# Patient Record
Sex: MALE | Race: WHITE | NOT HISPANIC OR LATINO | Employment: FULL TIME | URBAN - METROPOLITAN AREA
[De-identification: names, ages, dates, MRNs, and addresses within clinical notes are randomized per-mention and may not be internally consistent; named-entity substitution may affect disease eponyms.]

---

## 2017-01-11 LAB
BUN SERPL-MCNC: 17 MG/DL (ref 7–18)
CALCIUM SERPL-MCNC: 10.1 MG/DL (ref 8.5–10.1)
CHLORIDE SERPL-SCNC: 103 MMOL/L (ref 98–107)
CHOLEST SERPL-MCNC: 177 MG/DL
CO2 SERPL-SCNC: 28 MMOL/L (ref 21–32)
CREAT SERPL-MCNC: 1.11 MG/DL (ref 0.6–1.3)
GLUCOSE SERPL-MCNC: 109 MG/DL (ref 74–106)
HDLC SERPL-MCNC: 53 MG/DL (ref 35–60)
LDLC SERPL CALC-MCNC: 110 MG/DL
POTASSIUM SERPL-SCNC: 4.5 MMOL/L (ref 3.5–5.1)
SODIUM SERPL-SCNC: 138 MEQ/L (ref 131–145)
TRIGL SERPL-MCNC: 93 MG/DL (ref 30–150)

## 2018-12-28 ENCOUNTER — HISTORICAL (OUTPATIENT)
Dept: ADMINISTRATIVE | Facility: HOSPITAL | Age: 53
End: 2018-12-28

## 2018-12-28 LAB
ALBUMIN SERPL-MCNC: 4.5 GM/DL (ref 3.4–5)
ALBUMIN/GLOB SERPL: 1.55 {RATIO} (ref 1.5–2.5)
ALP SERPL-CCNC: 53 UNIT/L (ref 38–126)
ALT SERPL-CCNC: 33 UNIT/L (ref 7–52)
AST SERPL-CCNC: 24 UNIT/L (ref 15–37)
BILIRUB SERPL-MCNC: 1 MG/DL (ref 0.2–1)
BILIRUBIN DIRECT+TOT PNL SERPL-MCNC: 0.2 MG/DL (ref 0–0.5)
BILIRUBIN DIRECT+TOT PNL SERPL-MCNC: 0.8 MG/DL
BUN SERPL-MCNC: 16 MG/DL (ref 7–18)
CALCIUM SERPL-MCNC: 9.2 MG/DL (ref 8.5–10)
CHLORIDE SERPL-SCNC: 106 MMOL/L (ref 98–107)
CHOLEST SERPL-MCNC: 186 MG/DL (ref 0–200)
CHOLEST/HDLC SERPL: 4 {RATIO}
CO2 SERPL-SCNC: 27 MMOL/L (ref 21–32)
CREAT SERPL-MCNC: 1.04 MG/DL (ref 0.6–1.3)
GLOBULIN SER-MCNC: 2.9 GM/DL (ref 1.2–3)
GLUCOSE SERPL-MCNC: 105 MG/DL (ref 74–106)
HDLC SERPL-MCNC: 46 MG/DL (ref 35–60)
LDLC SERPL CALC-MCNC: 121 MG/DL (ref 0–129)
POTASSIUM SERPL-SCNC: 4.3 MMOL/L (ref 3.5–5.1)
PROT SERPL-MCNC: 7.4 GM/DL (ref 6.4–8.2)
PSA SERPL-MCNC: 0.25 NG/ML (ref 0–3.5)
SODIUM SERPL-SCNC: 138 MMOL/L (ref 136–145)
TRIGL SERPL-MCNC: 101 MG/DL (ref 30–150)
VLDLC SERPL CALC-MCNC: 20.2 MG/DL

## 2019-07-01 ENCOUNTER — HISTORICAL (OUTPATIENT)
Dept: ADMINISTRATIVE | Facility: HOSPITAL | Age: 54
End: 2019-07-01

## 2019-07-01 LAB
ALBUMIN SERPL-MCNC: 4.5 GM/DL (ref 3.4–5)
ALBUMIN/GLOB SERPL: 1.8 {RATIO} (ref 1.5–2.5)
ALP SERPL-CCNC: 72 UNIT/L (ref 38–126)
ALT SERPL-CCNC: 29 UNIT/L (ref 7–52)
AST SERPL-CCNC: 23 UNIT/L (ref 15–37)
BILIRUB SERPL-MCNC: 0.8 MG/DL (ref 0.2–1)
BILIRUBIN DIRECT+TOT PNL SERPL-MCNC: 0.2 MG/DL (ref 0–0.5)
BILIRUBIN DIRECT+TOT PNL SERPL-MCNC: 0.6 MG/DL
BUN SERPL-MCNC: 11 MG/DL (ref 7–18)
CALCIUM SERPL-MCNC: 9.5 MG/DL (ref 8.5–10)
CHLORIDE SERPL-SCNC: 104 MMOL/L (ref 98–107)
CHOLEST SERPL-MCNC: 140 MG/DL (ref 0–200)
CHOLEST/HDLC SERPL: 2.8 {RATIO}
CO2 SERPL-SCNC: 28 MMOL/L (ref 21–32)
CREAT SERPL-MCNC: 0.94 MG/DL (ref 0.6–1.3)
GLOBULIN SER-MCNC: 2.5 GM/DL (ref 1.2–3)
GLUCOSE SERPL-MCNC: 99 MG/DL (ref 74–106)
HDLC SERPL-MCNC: 50 MG/DL (ref 35–60)
LDLC SERPL CALC-MCNC: 65 MG/DL (ref 0–129)
POTASSIUM SERPL-SCNC: 4.5 MMOL/L (ref 3.5–5.1)
PROT SERPL-MCNC: 7 GM/DL (ref 6.4–8.2)
PSA SERPL-MCNC: 0.28 NG/ML (ref 0–3.5)
SODIUM SERPL-SCNC: 138 MMOL/L (ref 136–145)
TRIGL SERPL-MCNC: 54 MG/DL (ref 30–150)
VLDLC SERPL CALC-MCNC: 10.8 MG/DL

## 2020-11-18 ENCOUNTER — HISTORICAL (OUTPATIENT)
Dept: ADMINISTRATIVE | Facility: HOSPITAL | Age: 55
End: 2020-11-18

## 2020-11-18 LAB
ALBUMIN SERPL-MCNC: 5 GM/DL (ref 3.4–5)
ALBUMIN/GLOB SERPL: 1.79 {RATIO} (ref 1.5–2.5)
ALP SERPL-CCNC: 75 UNIT/L (ref 38–126)
ALT SERPL-CCNC: 28 UNIT/L (ref 7–52)
AST SERPL-CCNC: 26 UNIT/L (ref 15–37)
BILIRUB SERPL-MCNC: 0.9 MG/DL (ref 0.2–1)
BILIRUBIN DIRECT+TOT PNL SERPL-MCNC: 0.2 MG/DL (ref 0–0.5)
BILIRUBIN DIRECT+TOT PNL SERPL-MCNC: 0.7 MG/DL
BUN SERPL-MCNC: 18 MG/DL (ref 7–18)
CALCIUM SERPL-MCNC: 10.2 MG/DL (ref 8.5–10.1)
CHLORIDE SERPL-SCNC: 102 MMOL/L (ref 98–107)
CHOLEST SERPL-MCNC: 173 MG/DL (ref 0–200)
CHOLEST/HDLC SERPL: 2.3 {RATIO}
CO2 SERPL-SCNC: 26 MMOL/L (ref 21–32)
CREAT SERPL-MCNC: 1.09 MG/DL (ref 0.6–1.3)
GLOBULIN SER-MCNC: 2.8 GM/DL (ref 1.2–3)
GLUCOSE SERPL-MCNC: 97 MG/DL (ref 74–106)
HDLC SERPL-MCNC: 76 MG/DL (ref 35–60)
LDLC SERPL CALC-MCNC: 89 MG/DL (ref 0–129)
POTASSIUM SERPL-SCNC: 4.7 MMOL/L (ref 3.5–5.1)
PROT SERPL-MCNC: 7.8 GM/DL (ref 6.4–8.2)
PSA SERPL-MCNC: 0.37 NG/ML (ref 0–3.5)
SODIUM SERPL-SCNC: 139 MMOL/L (ref 136–145)
TRIGL SERPL-MCNC: 99 MG/DL (ref 30–150)
VLDLC SERPL CALC-MCNC: 19.8 MG/DL

## 2021-11-19 ENCOUNTER — HISTORICAL (OUTPATIENT)
Dept: ADMINISTRATIVE | Facility: HOSPITAL | Age: 56
End: 2021-11-19

## 2021-11-19 LAB
ALBUMIN SERPL-MCNC: 4.7 GM/DL (ref 3.4–5)
ALBUMIN/GLOB SERPL: 1.68 {RATIO} (ref 1.5–2.5)
ALP SERPL-CCNC: 63 UNIT/L (ref 38–126)
ALT SERPL-CCNC: 43 UNIT/L (ref 7–52)
AST SERPL-CCNC: 37 UNIT/L (ref 15–37)
BILIRUB SERPL-MCNC: 0.7 MG/DL (ref 0.2–1)
BILIRUBIN DIRECT+TOT PNL SERPL-MCNC: 0.1 MG/DL (ref 0–0.5)
BILIRUBIN DIRECT+TOT PNL SERPL-MCNC: 0.6 MG/DL
BUN SERPL-MCNC: 16 MG/DL (ref 7–18)
CALCIUM SERPL-MCNC: 9.9 MG/DL (ref 8.5–10.1)
CHLORIDE SERPL-SCNC: 103 MMOL/L (ref 98–107)
CHOLEST SERPL-MCNC: 194 MG/DL (ref 0–200)
CHOLEST/HDLC SERPL: 3.2 {RATIO}
CO2 SERPL-SCNC: 22 MMOL/L (ref 21–32)
CREAT SERPL-MCNC: 1.01 MG/DL (ref 0.6–1.3)
GLOBULIN SER-MCNC: 2.8 GM/DL (ref 1.2–3)
GLUCOSE SERPL-MCNC: 103 MG/DL (ref 74–106)
HDLC SERPL-MCNC: 60 MG/DL (ref 35–60)
LDLC SERPL CALC-MCNC: 108 MG/DL (ref 0–129)
POTASSIUM SERPL-SCNC: 5 MMOL/L (ref 3.5–5.1)
PROT SERPL-MCNC: 7.5 GM/DL (ref 6.4–8.2)
PSA SERPL-MCNC: 0.21 NG/ML (ref 0–3.5)
SODIUM SERPL-SCNC: 140 MMOL/L (ref 136–145)
TRIGL SERPL-MCNC: 121 MG/DL (ref 30–150)
VLDLC SERPL CALC-MCNC: 24.2 MG/DL

## 2021-11-22 LAB — EST CREAT CLEARANCE SER (OHS): 107.07 ML/MIN

## 2022-04-10 ENCOUNTER — HISTORICAL (OUTPATIENT)
Dept: ADMINISTRATIVE | Facility: HOSPITAL | Age: 57
End: 2022-04-10

## 2022-04-27 VITALS
HEIGHT: 68 IN | DIASTOLIC BLOOD PRESSURE: 70 MMHG | SYSTOLIC BLOOD PRESSURE: 120 MMHG | BODY MASS INDEX: 30.61 KG/M2 | WEIGHT: 201.94 LBS

## 2022-05-03 NOTE — HISTORICAL [facility]
This is a historical note converted from Naun. Formatting and pictures may have been removed.  Please reference Naun for original formatting and attached multimedia. Chief Complaint  Wellness  History of Present Illness  85-year-old  male presents the office today for scheduled annual wellness examination and recheck of chronic conditions including?hyperlipidemia, anxiety/depression.? He reports 100% compliance with all medications. ?Denies any side effects/adverse reactions/intolerances medications.? He is overdue?for his Tdap vaccination.? Expresses that he is willing to receive this today in the exam room.? Pleasantly but adamantly declines/refuses the?2020 influenza vaccination.? Next colonoscopy?scheduled for 2021.? Last PSA 0.28.? Personal prescription medication is working as intended.? No side effects?as mentioned above.? Denies suicidal ideation etc.? Denies nicotine/tobacco use. ?Denies illicit drug use.? He expresses that he participates in daily cardiovascular exercise?combination of?walking/jogging on treadmill?as well as a home elliptical.? Expresses he does this?approximately 3-4 days weekly approximately 45 minutes to 1 hour?on each day.? 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  Review of Systems  ?14 point Review of Systems performed with no exceptions for new complaints other than as noted in HPI.  Physical Exam  Vitals & Measurements  HR:?76(Peripheral)? BP:?104/82?  HT:?172.72?cm? WT:?90.8?kg?  ?  PHYSICAL EXAM  ?   WDWN patient in NAD, ?AFVSS  HEENT - no acute abnormality  ??????????????? oropharynx WNL  HEART - RRR  LUNGS -? CTA  ABDOMEN - benign, NTND;? no peritoneal signs  EXTREMITIES - No CCE  SKIN - warm, dry, intact  PSYCH - affect appropriate;? alert and oriented  NEURO- no new deficits noted;? cranial nerves grossly intact  ?  Assessment/Plan  1.?Wellness examination?Z00.00  ?Wellness labs today.? Doing well. ?Continue with  cardiovascular exercise.? Continue to limit excessive simple carbohydrates from daily dietary intake.? Return to clinic in 4 months for next wellness exam/recheck.  Ordered:  Clinic Follow-Up Wellness, *Est. 11/18/21 3:00:00 CST, Order for future visit, Wellness examination  HLD (hyperlipidemia)  Obesity  Depression, [facility]  Comprehensive Metabolic Panel, Routine collect, 11/18/20 7:58:00 CST, Blood, Stop date 11/18/20 7:58:00 CST, Lab Collect, Wellness examination, 11/18/20 7:58:00 CST  Lipid Panel, Routine collect, 11/18/20 7:58:00 CST, Blood, Stop date 11/18/20 7:58:00 CST, Lab Collect, Wellness examination, 11/18/20 7:58:00 CST  Preventative Health Care Est 40-64 years [id number] PC, Wellness examination, [facility], 11/18/20 7:49:00 CST  Prostate Specific Antigen, Routine collect, 11/18/20 7:58:00 CST, Blood, Stop date 11/18/20 7:58:00 CST, Lab Collect, Wellness examination, 11/18/20 7:58:00 CST  ?  2.?HLD (hyperlipidemia)?E78.5  ?Fasting lipid panel today.  Ordered:  Clinic Follow-Up Wellness, *Est. 11/18/21 3:00:00 CST, Order for future visit, Wellness examination  HLD (hyperlipidemia)  Obesity  Depression, [facility]  ?  3.?Obesity?E66.9  ?Continue regular exercise as above.  Ordered:  Clinic Follow-Up Wellness, *Est. 11/18/21 3:00:00 CST, Order for future visit, Wellness examination  HLD (hyperlipidemia)  Obesity  Depression, [facility]  ?  4.?Depression?F32.9  ?Well-controlled on?prescription Zoloft 100 mg daily.? Continue as prescribed.  Ordered:  Clinic Follow-Up Wellness, *Est. 11/18/21 3:00:00 CST, Order for future visit, Wellness examination  HLD (hyperlipidemia)  Obesity  Depression, [facility]  ?  Referrals  Clinic Follow up, Order for future visit  Clinic Follow-Up Wellness, *Est. 11/18/21 3:00:00 CST, Order for future visit, Wellness examination  HLD (hyperlipidemia)  Obesity  Depression, [facility]   Problem List/Past Medical History  Ongoing  Agatston CAC score, <100  Eosinophilic  esophagitis  Gastro-esophageal reflux  HLD (hyperlipidemia)  Medication management  Obesity  Refused influenza vaccine  Seasonal allergies  Historical  No qualifying data  Procedure/Surgical History  Colonoscopy (02/22/2016)  CT of heart without contrast with calcium scoring (01/14/2016)  Adenoidectomy (1973)  Tympanostomy (1973)  wisdom teeth   Medications  atorvastatin 40 mg oral tablet, See Instructions  sertraline 100 mg oral tablet, See Instructions  Allergies  No Known Medication Allergies  Social History  Abuse/Neglect  No, 11/18/2020  No, 07/01/2019  Alcohol  Employment/School  Employed, 08/31/2018  Home/Environment  Lives with Spouse. Living situation: Home/Independent., 08/31/2018  Substance Use  Never, 08/31/2018  Tobacco  Never (less than 100 in lifetime), N/A, 11/18/2020  Never (less than 100 in lifetime), N/A, 07/01/2019  Family History  Aneurysm: Mother.  CAD - Coronary artery disease: Father.  Cyst tissue: Sister.  Immunizations  Vaccine Date Status   tetanus/diphtheria/pertussis, acel(Tdap) 11/18/2020 Given   tetanus/diphth/pertuss (Tdap) adult/adol 09/23/2017 Recorded   tetanus/diphth/pertuss (Tdap) adult/adol 02/08/2008 Recorded   measles/mumps/rubella virus vaccine 07/25/1995 Recorded   poliovirus vaccine, live, trivalent 02/06/1986 Recorded   tetanus-diphtheria toxoids 10/30/1985 Recorded   poliovirus vaccine, live, trivalent 10/28/1985 Recorded   tetanus-diphtheria toxoids 10/28/1985 Recorded   tetanus-diphtheria toxoids 09/19/1985 Recorded   Health Maintenance  Health Maintenance  ???Pending?(in the next year)  ??? ??OverDue  ??? ? ? ?Obesity Screening due??01/01/20??and every 1??year(s)  ??? ? ? ?Alcohol Misuse Screening due??01/02/20??and every 1??year(s)  ??? ? ? ?Body Mass Index Check due??06/30/20??and every 1??year(s)  ??? ??Due?  ??? ? ? ?Influenza Vaccine due??10/01/20??and every 1??day(s)  ??? ? ? ?ADL Screening due??11/18/20??and every 1??year(s)  ??? ? ? ?Aspirin Therapy for CVD  Prevention due??11/18/20??and every 1??year(s)  ??? ? ? ?Zoster Vaccine due??11/18/20??Unknown Frequency  ???Satisfied?(in the past 1 year)  ??? ??Satisfied?  ??? ? ? ?Blood Pressure Screening on??11/18/20.??Satisfied by Karla Tariq MA  ??? ? ? ?Influenza Vaccine on??11/18/20.??Satisfied by Karla Tariq MA  ??? ? ? ?Tetanus Vaccine on??11/18/20.??Satisfied by Karla Tariq MA  ?      Physician?was in the building when patient was?seen and examined by the nurse practitioner.? I concur with the?assessment/plan/management?of the patient.x

## 2022-05-03 NOTE — HISTORICAL [facility]
This is a historical note converted from Naun. Formatting and pictures may have been removed.  Please reference Naun for original formatting and attached multimedia. Chief Complaint  wellness  History of Present Illness  weighed 228 on january 1st ,? started daily exercise and good diet,? now 163#  zoloft still working well  Review of Systems  Except as noted above has no new complaints regarding:  Constitutional:?no weight gain,?no weight loss,?no fatigue,?no fever,?no chills,?no weakness.  Eyes:?no vision loss/changes,??no pain,?no double vision,??  Head:?no headache,?no head injury,?no neck pain.?  Neck:??no lumps,?no swollen glands,?no stiffness.  Ears:??no ringing,?no earache,?no drainage.?  Nose:?no stuffiness,?no discharge,?no itching,no postnasal drip, ?no nosebleeds,?no sinus pain.  Throat:?no dry mouth,?no sore throat,?no hoarseness,?no thrush,?no non-healing sores.  Cardiovascular:?no chest pain or discomfort,?no tightness,?no palpitations,?no SOB with activity,?no difficulty breathing while supine,?no swelling,?no sudden awakening from sleep with SOB.  Vascular:?no calf pain with walking,?no leg cramping.  Respiratory:??no cough,?no sputum,?no coughing up blood,?no SOB,?no wheezing,?no painful breathing.  Gastrointestinal:?no swallowing difficulty,?no heartburn,?no change in appetite,?no nausea,?no change in bowel habits,?no rectal bleeding,?no constipation,?no diarrhea,?no yellow eyes or skin.  Urinary:?no frequency,?no urgency,?no burning or pain,?no blood in urine,?no incontinence,?no change in urinary strength.  Musculoskeletal:?no muscle or joint pain,?no stiffness,?,?no redness of joints,?no swelling of joints,?no trauma.  Skin:?no rashes,?no lumps,?no itching,?no dryness,??no hair or nail changes.  Neurologic:?no dizziness,?no fainting,?no seizures,?no weakness,?no numbness,?no tingling,?no tremors.  Psychiatric:?no nervousness,??no depression,?no memory loss.  Endocrine:?no heat or cold  intolerance,??no frequent urination,?no thirst,?no change in appetite.  Hematologic:?no ease of bruising,?no ease of bleeding.  ?  ?  ?  ?  Physical Exam  Vitals & Measurements  HR:?72(Peripheral)? BP:?100/52?  HT:?172.72?cm? WT:?74?kg? BMI:?24.81?  ?  PHYSICAL EXAM  ?   Well developed, well nourished, NAD, alert and oriented x4  Vitals reviewed  Head: NCAT  Eyes: EOMI, conjunctiva WNL, pupils symmetric  Ears: TMs and EACs clear  Nose: Mucosa WNL, No discharge, No sinus tenderness  O/P: No erythema or exudate  Neck: supple, FROM, no LA, no thyromegaly, no bruits auscultated  CV: RRR no MRG, peripheral pulses intact  Pulmonary: Effort normal and breath sounds normal, no wheeze  Abdomen: soft, NTND, no HSM;? ?NABS; no peritoneal signs?????  Musculoskeletal:? no tenderness, joints wnl for acute changes, FROM, neg SLR, no CCE  Skin: warm, dry, intact  Neuro: no motor/sensory deficits, reflexes 2+, cranial nerves grossly intact, cerebellar function appears intact  Lymphadenopathy: no abnormalities noted  Psychiatric: Cooperative, appropriate mood and affect, appears to have normal judgment  ?  ?  ?  ?  Assessment/Plan  1.?Wellness examination?Z00.00  ?dramatic improvement in lifestyle/diet/etc,??? lost about 70#on his scale  Ordered:  Clinic Follow up, *Est. 07/01/20 3:00:00 CDT, PLEASE MAKE THIS A 30 MINUTE PHYSICAL, Order for future visit, Wellness examination, [facility]  Comprehensive Metabolic Panel, Routine collect, 07/01/19 9:35:00 CDT, Blood, Order for future visit, Stop date 07/01/19 9:35:00 CDT, Lab Collect, Wellness examination, 07/01/19 9:35:00 CDT  Lipid Panel, Routine collect, 07/01/19 9:35:00 CDT, Blood, Order for future visit, Stop date 07/01/19 9:35:00 CDT, Lab Collect, Wellness examination, 07/01/19 9:35:00 CDT  Preventative Health Care Est 40-64 years [id number] PC, Wellness examination, [facility], 07/01/19 9:35:00 CDT  Prostate Specific Antigen, Routine collect, 07/01/19 9:35:00 CDT, Blood, Order for  future visit, Stop date 07/01/19 9:35:00 CDT, Lab Collect, Wellness examination, 07/01/19 9:35:00 CDT  ?  2.?Agatston CAC score, <100?R93.1  asymptomatic  ?  3.?HLD (hyperlipidemia)?E78.5  ?  Orders:  Lab Collection Request, 07/01/19 9:35:00 CDT, [facility], 07/01/19 9:35:00 CDT  Referrals  Clinic Follow up, *Est. 07/01/20 3:00:00 CDT, PLEASE MAKE THIS A 30 MINUTE PHYSICAL, Order for future visit, Wellness examination, [facility]   Problem List/Past Medical History  Ongoing  Agatston CAC score, <100  Eosinophilic esophagitis  Gastro-esophageal reflux  HLD (hyperlipidemia)  Medication management  Obesity  Seasonal allergies  Historical  No qualifying data  Procedure/Surgical History  Colonoscopy (02/22/2016)  CT of heart without contrast with calcium scoring (01/14/2016)  Adenoidectomy (1973)  Tympanostomy (1973)  wisdom teeth   Medications  atorvastatin 40 mg oral tablet, See Instructions  sertraline 100 mg oral tablet, 100 mg= 1 tab(s), Oral, Daily, 1 refills  Allergies  No Known Medication Allergies  Social History  Abuse/Neglect  No, 07/01/2019  Alcohol  Employment/School  Employed, 08/31/2018  Home/Environment  Lives with Spouse. Living situation: Home/Independent., 08/31/2018  Substance Use  Never, 08/31/2018  Tobacco  Never (less than 100 in lifetime), N/A, 07/01/2019  Family History  Aneurysm: Mother.  CAD - Coronary artery disease: Father.  Cyst tissue: Sister.  Immunizations  Vaccine Date Status   tetanus/diphth/pertuss (Tdap) adult/adol 02/08/2008 Recorded   measles/mumps/rubella virus vaccine 07/25/1995 Recorded   tetanus-diphtheria toxoids 10/30/1985 Recorded   tetanus-diphtheria toxoids 10/28/1985 Recorded   tetanus-diphtheria toxoids 09/19/1985 Recorded   Health Maintenance  Health Maintenance  ???Pending?(in the next year)  ??? ??OverDue  ??? ? ? ?Diabetes Screening due??and every?  ??? ? ? ?Tetanus Vaccine due??02/08/18??and every 10??year(s)  ??? ? ? ?Alcohol Misuse Screening due??01/01/19??and  every 1??year(s)  ??? ??Due?  ??? ? ? ?ADL Screening due??07/01/19??and every 1??year(s)  ??? ? ? ?Aspirin Therapy for CVD Prevention due??07/01/19??and every 1??year(s)  ??? ? ? ?Depression Screening due??07/01/19??and every?  ??? ??Due In Future?  ??? ? ? ?Obesity Screening not due until??01/01/20??and every 1??year(s)  ??? ? ? ?Blood Pressure Screening not due until??06/30/20??and every 1??year(s)  ??? ? ? ?Body Mass Index Check not due until??06/30/20??and every 1??year(s)  ???Satisfied?(in the past 1 year)  ??? ??Satisfied?  ??? ? ? ?Blood Pressure Screening on??07/01/19.??Satisfied by Ashley Butler LPN  ??? ? ? ?Body Mass Index Check on??07/01/19.??Satisfied by Ashley Butler LPN  ??? ? ? ?Diabetes Screening on??12/28/18.??Satisfied by Iris Swift  ??? ? ? ?Lipid Screening on??12/28/18.??Satisfied by Iris Swift  ??? ? ? ?Obesity Screening on??07/01/19.??Satisfied by Ashley Butler LPN  ?

## 2022-05-03 NOTE — HISTORICAL [facility]
This is a historical note converted from Naun. Formatting and pictures may have been removed.  Please reference Naun for original formatting and attached multimedia. Chief Complaint  wellness  History of Present Illness  36-year-old  male presents office today for annual wellness examination.? Also carries with him a history of?hyperlipidemia and?anxiety.? Reports 100% compliance with all prescribed medications. ?Denies any side effects as adverse reaction/intolerance. ?Medications are working well/as intended. ?Patient ?is receiving desired effects.? Denies needing/tobacco use. ?Denies drug use. ?Alcohol intake is reported as minimal/social/occasional.? Exercise almost daily. ?Typically?walking?30-45 minutes proximally 5 days a week. ?He is employed full-time primarily office/desk work.? Has scheduled?colonoscopy?later this year the beginning of 2022.? Last colonoscopy 2016. ?Up-to-date with Covid immunizations?soon to receive the?Covid booster.? Due for influenza vaccination. ?Historically refused influenza vaccination.??Up-to-date with Tdap.? Attempting to limit?excess calories and simple carbohydrates from his daily dietary take.  ?  Reports he has been having some flareups/trouble with sciatica.? Expresses that he?has been attempting some stretching.? Seems as though this is helping.? Episodes of?low back pain/left leg pain?are intermittent and only occasional.  Review of Systems  ?14 point Review of Systems performed with no exceptions for new complaints other than as noted in HPI.  Physical Exam  Vitals & Measurements  HR:?78(Peripheral)? BP:?120/70?  HT:?172.72?cm? HT:?172.72?cm? WT:?91.600?kg? WT:?91.6?kg? BMI:?30.71?  Well developed, well nourished, NAD, alert and oriented x4  Vitals reviewed  Head: NCAT  Eyes: EOMI, conjunctiva WNL, pupils symmetric  Ears: TMs and EACs clear  Nose: Mucosa WNL, No discharge, No sinus tenderness  O/P: No erythema or exudate  Neck: supple, FROM, no LA, no  thyromegaly, no bruits auscultated  CV: RRR no MRG, peripheral pulses intact  Pulmonary: Effort normal and breath sounds normal, no wheeze  Abdomen: soft, NTND, no HSM; ? NABS; no peritoneal signs ? ??  Musculoskeletal: ?no tenderness, joints wnl for acute changes, FROM, no CCE  Skin: warm, dry, intact  Neuro: no motor/sensory deficits, cranial nerves grossly intact, cerebellar function appears intact  Lymphadenopathy: no abnormalities noted  Psychiatric: Cooperative, appropriate mood and affect, appears to have normal judgment  ?  Assessment/Plan  1.?Wellness examination?Z00.00  ?Recommend 3-4 days of 30-45 minutes sessions of brisk walking/water aerobics/resistance training as tolerating. ?Recommend limiting excess simple carbohydrates from daily dietary regimen. recommend increase lean protein and vegetable matter.? Wellness labs today. ?Influenza vaccination today. ?PSA check today.  Ordered:  Comprehensive Metabolic Panel, Routine collect, 11/19/21 7:52:00 CST, Blood, Order for future visit, Stop date 11/19/21 7:52:00 CST, Lab Collect, Wellness examination, 11/19/21 7:52:00 CST  Lab Collection Request, 11/19/21 7:51:00 CST, [facility] - [facility], 11/19/21 7:51:00 CST, Wellness examination  Lipid Panel, Routine collect, 11/19/21 7:52:00 CST, Blood, Order for future visit, Stop date 11/19/21 7:52:00 CST, Lab Collect, Wellness examination, 11/19/21 7:52:00 CST  Preventative Health Care Est 40-64 years [id number] PC, Wellness examination, [facility] - [facility], 11/19/21 7:52:00 CST  Prostate Specific Antigen, Routine collect, 11/19/21 7:52:00 CST, Blood, Order for future visit, Stop date 11/19/21 7:52:00 CST, Lab Collect, Wellness examination, 11/19/21 7:52:00 CST  ?  2.?HLD (hyperlipidemia)?E78.5  Well-controlled on current medication regimen. ?Continue as prescribed.  Ordered:  Clinic Follow up, *Est. 05/19/22 3:00:00 CDT, Order for future visit, HLD (hyperlipidemia)  Lumbar back pain, [facility]  ?  3.?Lumbar back  pain?M54.50  ?Educational handout given?for exercises and stretching?reviewed discussed at length at conclusion of visit.? Will notify us if he desires further?discussion or work-up?medical treatment for symptoms.  Ordered:  Clinic Follow up, *Est. 05/19/22 3:00:00 CDT, Order for future visit, HLD (hyperlipidemia)  Lumbar back pain, [facility]  ?  Return to clinic in 6 months for recheck  Referrals  Clinic Follow up, *Est. 05/19/22 3:00:00 CDT, Order for future visit, HLD (hyperlipidemia)  Lumbar back pain, [facility]   Problem List/Past Medical History  Ongoing  Agatston CAC score, <100  Eosinophilic esophagitis  Gastro-esophageal reflux  HLD (hyperlipidemia)  Medication management  Obesity  Refused influenza vaccine  Seasonal allergies  Historical  No qualifying data  Procedure/Surgical History  Colonoscopy (02/22/2016)  CT of heart without contrast with calcium scoring (01/14/2016)  Adenoidectomy (1973)  Tympanostomy (1973)  wisdom teeth   Medications  atorvastatin 40 mg oral tablet, See Instructions  sertraline 100 mg oral tablet, See Instructions  Allergies  No Known Medication Allergies  Social History  Abuse/Neglect  No, 11/19/2021  Alcohol  Employment/School  Employed, 08/31/2018  Home/Environment  Lives with Spouse. Living situation: Home/Independent., 08/31/2018  Substance Use  Never, 08/31/2018  Tobacco  Never (less than 100 in lifetime), N/A, 11/19/2021  Family History  Aneurysm: Mother.  CAD - Coronary artery disease: Father.  Cyst tissue: Sister.  Immunizations  Vaccine Date Status   COVID-19 mRNA, LNP-S, PF - Moderna 05/03/2021 Recorded   COVID-19 mRNA, LNP-S, PF - Moderna 04/05/2021 Recorded   tetanus/diphtheria/pertussis, acel(Tdap) 11/18/2020 Given   tetanus/diphth/pertuss (Tdap) adult/adol 09/23/2017 Recorded   tetanus/diphth/pertuss (Tdap) adult/adol 02/08/2008 Recorded   measles/mumps/rubella virus vaccine 07/25/1995 Recorded   poliovirus vaccine, live, trivalent 02/06/1986 Recorded    tetanus-diphtheria toxoids 10/30/1985 Recorded   poliovirus vaccine, live, trivalent 10/28/1985 Recorded   tetanus-diphtheria toxoids 10/28/1985 Recorded   tetanus-diphtheria toxoids 09/19/1985 Recorded   Health Maintenance  Health Maintenance  ???Pending?(in the next year)  ??? ??Due?  ??? ? ? ?Aspirin Therapy for CVD Prevention due??11/19/21??and every 1??year(s)  ??? ? ? ?Zoster Vaccine due??11/19/21??Unknown Frequency  ??? ??Due In Future?  ??? ? ? ?Obesity Screening not due until??01/01/22??and every 1??year(s)  ??? ? ? ?Alcohol Misuse Screening not due until??01/02/22??and every 1??year(s)  ???Satisfied?(in the past 1 year)  ??? ??Satisfied?  ??? ? ? ?ADL Screening on??11/19/21.??Satisfied by Livan Lange NP  ??? ? ? ?Alcohol Misuse Screening on??11/19/21.??Satisfied by Livan Lange NP  ??? ? ? ?Blood Pressure Screening on??11/19/21.??Satisfied by Ashley Butler LPN  ??? ? ? ?Body Mass Index Check on??11/19/21.??Satisfied by Ashley Butler LPN  ??? ? ? ?Depression Screening on??11/19/21.??Satisfied by Livan Lange NP  ??? ? ? ?Obesity Screening on??11/19/21.??Satisfied by Ashley Butler LPN  ?      Physician?was in the building when patient was?seen and examined by the nurse practitioner.? I concur with the?assessment/plan/management?of the patient.

## 2022-05-19 PROBLEM — E78.5 HYPERLIPIDEMIA: Status: ACTIVE | Noted: 2022-05-19

## 2022-05-19 PROBLEM — J30.2 SEASONAL ALLERGIES: Status: ACTIVE | Noted: 2022-05-19

## 2022-05-19 PROBLEM — Z28.21 INFLUENZA VACCINATION DECLINED: Status: ACTIVE | Noted: 2022-05-19

## 2022-05-19 PROBLEM — R93.89 ABNORMAL COMPUTED TOMOGRAPHY SCAN: Status: ACTIVE | Noted: 2022-05-19

## 2022-05-19 PROBLEM — K20.0 EOSINOPHILIC ESOPHAGITIS: Status: ACTIVE | Noted: 2022-05-19

## 2022-05-19 PROBLEM — K21.9 GASTROESOPHAGEAL REFLUX DISEASE: Status: ACTIVE | Noted: 2022-05-19

## 2022-09-17 ENCOUNTER — HISTORICAL (OUTPATIENT)
Dept: ADMINISTRATIVE | Facility: HOSPITAL | Age: 57
End: 2022-09-17

## 2022-11-22 PROBLEM — R93.89 ABNORMAL COMPUTED TOMOGRAPHY SCAN: Status: RESOLVED | Noted: 2022-05-19 | Resolved: 2022-11-22

## 2022-11-22 PROBLEM — E78.2 MIXED HYPERLIPIDEMIA: Status: ACTIVE | Noted: 2022-05-19

## 2023-05-22 PROBLEM — F41.9 ANXIETY: Status: ACTIVE | Noted: 2023-05-22
